# Patient Record
Sex: FEMALE | Race: WHITE | NOT HISPANIC OR LATINO | Employment: FULL TIME | URBAN - METROPOLITAN AREA
[De-identification: names, ages, dates, MRNs, and addresses within clinical notes are randomized per-mention and may not be internally consistent; named-entity substitution may affect disease eponyms.]

---

## 2018-05-30 ENCOUNTER — HISTORICAL (OUTPATIENT)
Dept: ADMINISTRATIVE | Facility: HOSPITAL | Age: 38
End: 2018-05-30

## 2018-07-16 ENCOUNTER — HISTORICAL (OUTPATIENT)
Dept: ADMINISTRATIVE | Facility: HOSPITAL | Age: 38
End: 2018-07-16

## 2019-08-28 ENCOUNTER — HISTORICAL (OUTPATIENT)
Dept: ADMINISTRATIVE | Facility: HOSPITAL | Age: 39
End: 2019-08-28

## 2022-04-29 VITALS
HEIGHT: 65 IN | DIASTOLIC BLOOD PRESSURE: 60 MMHG | WEIGHT: 173.94 LBS | WEIGHT: 188.94 LBS | HEIGHT: 65 IN | BODY MASS INDEX: 31.48 KG/M2 | BODY MASS INDEX: 29.53 KG/M2 | HEIGHT: 65 IN | WEIGHT: 177.25 LBS | SYSTOLIC BLOOD PRESSURE: 120 MMHG | SYSTOLIC BLOOD PRESSURE: 110 MMHG | SYSTOLIC BLOOD PRESSURE: 90 MMHG | DIASTOLIC BLOOD PRESSURE: 50 MMHG | DIASTOLIC BLOOD PRESSURE: 60 MMHG | BODY MASS INDEX: 28.98 KG/M2

## 2022-05-02 NOTE — HISTORICAL [facility] CERNER
This is a historical note converted from Kelsi. Formatting and pictures may have been removed.  Please reference Kelsi for original formatting and attached multimedia. Chief Complaint  LEFT HIP & LEFT KNEE PAIN  History of Present Illness  Pt has been having left hip bursitis for which she got an injection last July.? She feels a pressure/knot in that hip; she is unable to lie on that side. Her left knee has been bothering her for 3 months; started after she painted her sons room. She stood on a small stepladder to paint his room. She has problems bending her knee and is unable to kneel on it. Was popping a lot till she decreased her activities. She had experienced some giving out of her knee in the past. She doesnt take anything regularly in fear of upsetting her stomach. No history of knee problems.  Review of Systems  Pt also states her stomach was doing well till recently. She does have increased stress with her daughter who has Aspergers.? Her reflux is better but she still has lots of gas.? She has had to take Zantac every other evening for the last 3 weeks.  She had some popping her left ear x last week. Denies ear pain. Slight pressure; denies loss of hearing.  Physical Exam  Vitals & Measurements  HR:?72(Peripheral)? BP:?110/60?  HT:?165?cm? HT:?165?cm? WT:?78.9?kg? WT:?78.9?kg? BMI:?28.98?  Left hip: significant tenderness over greater trochanter; she has good range of motion in her hip none of which increases her hip pain  Left knee:? good flexion/extension, Negative Lachmans/McMurrays. Mildly tender medial to patella. Normal gait. Has difficulty arising from a seated position.  Ears: left: slight fluid accumulation, canal clear, no erythema.  Assessment/Plan  1.?Left knee pain  ?Diclofenac 50 mg bid (30 x 1)  Ordered:  Office/Outpatient Visit Level 4 Established [id number] PC, Left knee pain  Trochanteric bursitis  GERD - Gastro-esophageal reflux disease  Left serous otitis media, [facility],  05/30/18 14:11:00 CDT  ?  2.?Trochanteric bursitis  ?If Diclofenac is not helpful, we will inject her hip.  Ordered:  Office/Outpatient Visit Level 4 Established [id number] PC, Left knee pain  Trochanteric bursitis  GERD - Gastro-esophageal reflux disease  Left serous otitis media, [facility], 05/30/18 14:11:00 CDT  ?  3.?GERD - Gastro-esophageal reflux disease  ?Stable at this time. Continue Protonix daily and Zantac prn.  Ordered:  Office/Outpatient Visit Level 4 Established [id number] PC, Left knee pain  Trochanteric bursitis  GERD - Gastro-esophageal reflux disease  Left serous otitis media, [facility], 05/30/18 14:11:00 CDT  ?  4.?Left serous otitis media  ?Continue Zyrtec and Flonase.  Ordered:  Office/Outpatient Visit Level 4 Established [id number] PC, Left knee pain  Trochanteric bursitis  GERD - Gastro-esophageal reflux disease  Left serous otitis media, [facility], 05/30/18 14:11:00 CDT  ?  Left hip pain  ?See # 2.  Ordered:  XR Knee Left 1 or 2 Views, Routine, 05/30/18 13:49:00 CDT, Other (please specify), None, Ambulatory, Rad Type, Left hip pain, [facility], 05/30/18 13:49:00 CDT  ?  Orders:  pantoprazole, 40 mg = 1 tab(s), Oral, Daily, # 30 tab(s), 5 Refill(s), Pharmacy: [facility] [id number]  Clinic Follow-up PRN, 05/30/18 14:11:00 CDT, [facility], Future Order   Problem List/Past Medical History  Ongoing  GERD - Gastro-esophageal reflux disease  Left knee pain  Left serous otitis media  Trochanteric bursitis  Historical  Carpal tunnel syndrome of left wrist  Procedure/Surgical History  Dilation and curettage of uterus (2013).  Medications  diclofenac potassium 50 mg oral tablet, 50 mg= 1 tab(s), Oral, BID, PRN, 1 refills  multivitamin with fluoride, Daily  Pantoprazole 40 mg ORAL EC-Tablet, 40 mg= 1 tab(s), Oral, Daily, 5 refills  TAYTULLA CAPSULES 28S, 1 cap(s), Oral, Daily  Zyrtec 10 mg oral tablet, 10 mg= 1 tab(s), Oral, Daily  Allergies  No Known Medication  Allergies  Social History  Alcohol  Current, 3-5 times per week, 05/30/2018  Employment/School  Employed, Work/School description: [employer] A [employer]., 05/30/2018  Exercise  Home/Environment  Living situation: Home/Independent., 05/30/2018  Nutrition/Health  Type of diet: NO FRIED FOODS AT NIGHT. Regular, Caffeine intake amount: 1 CUP OF COFFEE PER DAY., 05/30/2018  Substance Abuse  Never, 05/30/2018  Tobacco  Never smoker, 05/04/2015  Family History  Back pain: Brother.  Kidney disease: Mother.  Peptic ulcer disease: Father.      Per patients request, she will be referred to Dermatology for evaluation of moles.   X-RAYS: negative
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
## 2022-05-02 NOTE — HISTORICAL [facility] CERNER
This is a historical note converted from Kelsi. Formatting and pictures may have been removed.  Please reference Kelsi for original formatting and attached multimedia. Chief Complaint  LEFT HIP BURSITIS  History of Present Illness  Pt has been having problems with her left hip x 2 years. She has received 2 injections in the past. The 1st one worked well but the second injection not so much. She cant take NSAIDs secondary to her stomach. She reports pain, stiffness and soreness in her left hip. She denies any history of a work up involving involving her left hip. She has problems going up and down stairs as well as regular walking. She has constant discomfort. She has stiffness with prolonged sitting or lying down. She denies any other joint involvement. She denies any other joint involvement except for a history of problem with her SI?joints.  Review of Systems  See HPI.  Physical Exam  Vitals & Measurements  HR:?88(Peripheral)? BP:?120/60?  HT:?165?cm? WT:?85.7?kg? BMI:?31.48?  General: She is a well-developed well-nourished white female no apparent distress.  Left hip: Normal in appearance,?no skin lesions,?normal gait noted.? She is able to climb on exam table without?difficulty.? She has tenderness to palpation over?her greater trochanter.? There is no discomfort noted with?hip flexion,?abduction, abduction?or internal/external rotation.  ?  Assessment/Plan  1.?Trochanteric bursitis?M70.60  Patient has persistent?left hip pain.? I am concerned she may have?another issue in addition to her?hip bursitis.  X-ray interpretation pending.  Will refer to Dr. Lee.  Ordered:  External Referral, left hip pain/bursitis, Ortho, Dr Lee, 08/28/19 10:57:00 CDT, Trochanteric bursitis  ?  Referrals  External Referral, left hip pain/bursitis, Ortho, Dr Lee, 08/28/19 10:57:00 CDT, Trochanteric bursitis   Problem List/Past Medical History  Ongoing  GERD - Gastro-esophageal reflux disease  Left knee pain  Left serous otitis  media  Left wrist sprain  Obesity  Trochanteric bursitis  Historical  Carpal tunnel syndrome of left wrist  Procedure/Surgical History  Colonoscopy (04/12/2019)  Dilation and curettage of uterus (2013)   Medications  diclofenac potassium 50 mg oral tablet, See Instructions, 2 refills  multivitamin with fluoride, Daily  Pantoprazole 40 mg ORAL EC-Tablet, 40 mg= 1 tab(s), Oral, Daily, 5 refills  ranitidine 300 mg oral tablet, 300 mg= 1 tab(s), Oral, qPM  TAYTULLA CAPSULES 28S, 1 cap(s), Oral, Daily  Zyrtec 10 mg oral tablet, 10 mg= 1 tab(s), Oral, Daily  Allergies  No Known Medication Allergies  Social History  Abuse/Neglect  No, 08/28/2019  Alcohol  Current, 3-5 times per week, 05/30/2018  Employment/School  Employed, Work/School description: [employer] A [employer]IVATE [employer]., 05/30/2018  Exercise  Home/Environment  Living situation: Home/Independent., 05/30/2018  Nutrition/Health  Type of diet: NO FRIED FOODS AT NIGHT. Regular, Caffeine intake amount: 1 CUP OF COFFEE PER DAY., 05/30/2018  Substance Use  Never, 05/30/2018  Tobacco  Never (less than 100 in lifetime), N/A, 08/28/2019  Never smoker, 05/04/2015  Family History  Back pain: Brother.  Kidney disease: Mother.  Peptic ulcer disease: Father.  Health Maintenance  Health Maintenance  ???Pending?(in the next year)  ??? ??OverDue  ??? ? ? ?Cervical Cancer Screening due??08/18/13??and every 3??year(s)  ??? ? ? ?Alcohol Misuse Screening due??01/01/19??and every 1??year(s)  ??? ??Due?  ??? ? ? ?ADL Screening due??08/28/19??and every 1??year(s)  ??? ? ? ?Depression Screening due??08/28/19??and every?  ??? ? ? ?Influenza Vaccine due??08/28/19??and every?  ??? ? ? ?Tetanus Vaccine due??08/28/19??and every 10??year(s)  ??? ??Due In Future?  ??? ? ? ?Obesity Screening not due until??01/01/20??and every 1??year(s)  ??? ? ? ?Blood Pressure Screening not due until??08/27/20??and every 1??year(s)  ??? ? ? ?Body Mass Index Check not due until??08/27/20??and every  1??year(s)  ???Satisfied?(in the past 1 year)  ??? ??Satisfied?  ??? ? ? ?Blood Pressure Screening on??08/28/19.??Satisfied by Delilah Staples LPN  ??? ? ? ?Body Mass Index Check on??08/28/19.??Satisfied by Delilah Staples LPN  ??? ? ? ?Obesity Screening on??08/28/19.??Satisfied by Delilah Staples LPN  ?      X-rays of left hip are normal.

## 2023-01-17 PROBLEM — J30.2 SEASONAL ALLERGIES: Status: ACTIVE | Noted: 2023-01-17

## 2023-01-17 PROBLEM — E66.811 CLASS 1 OBESITY DUE TO EXCESS CALORIES WITHOUT SERIOUS COMORBIDITY WITH BODY MASS INDEX (BMI) OF 32.0 TO 32.9 IN ADULT: Status: ACTIVE | Noted: 2023-01-17

## 2023-01-17 PROBLEM — E66.09 CLASS 1 OBESITY DUE TO EXCESS CALORIES WITHOUT SERIOUS COMORBIDITY WITH BODY MASS INDEX (BMI) OF 32.0 TO 32.9 IN ADULT: Status: ACTIVE | Noted: 2023-01-17

## 2023-01-17 PROBLEM — Z00.00 ENCOUNTER FOR WELLNESS EXAMINATION IN ADULT: Status: ACTIVE | Noted: 2023-01-17

## 2023-04-24 PROBLEM — Z00.00 ENCOUNTER FOR WELLNESS EXAMINATION IN ADULT: Status: RESOLVED | Noted: 2023-01-17 | Resolved: 2023-04-24

## 2024-01-17 ENCOUNTER — OFFICE VISIT (OUTPATIENT)
Dept: URGENT CARE | Facility: CLINIC | Age: 44
End: 2024-01-17
Payer: COMMERCIAL

## 2024-01-17 VITALS
DIASTOLIC BLOOD PRESSURE: 78 MMHG | HEIGHT: 64 IN | OXYGEN SATURATION: 100 % | RESPIRATION RATE: 20 BRPM | WEIGHT: 180 LBS | BODY MASS INDEX: 30.73 KG/M2 | HEART RATE: 118 BPM | SYSTOLIC BLOOD PRESSURE: 134 MMHG | TEMPERATURE: 98 F

## 2024-01-17 DIAGNOSIS — T14.8XXA ANIMAL BITE: Primary | ICD-10-CM

## 2024-01-17 DIAGNOSIS — S61.214A LACERATION OF RIGHT RING FINGER WITHOUT FOREIGN BODY WITHOUT DAMAGE TO NAIL, INITIAL ENCOUNTER: ICD-10-CM

## 2024-01-17 PROCEDURE — 12041 INTMD RPR N-HF/GENIT 2.5CM/<: CPT | Mod: ,,,

## 2024-01-17 PROCEDURE — 99203 OFFICE O/P NEW LOW 30 MIN: CPT | Mod: 25,,,

## 2024-01-17 PROCEDURE — 90715 TDAP VACCINE 7 YRS/> IM: CPT | Mod: ,,,

## 2024-01-17 PROCEDURE — 96372 THER/PROPH/DIAG INJ SC/IM: CPT | Mod: 59,,,

## 2024-01-17 PROCEDURE — 90471 IMMUNIZATION ADMIN: CPT | Mod: ,,,

## 2024-01-17 RX ORDER — CEFTRIAXONE 1 G/1
1 INJECTION, POWDER, FOR SOLUTION INTRAMUSCULAR; INTRAVENOUS
Status: COMPLETED | OUTPATIENT
Start: 2024-01-17 | End: 2024-01-17

## 2024-01-17 RX ORDER — AMOXICILLIN AND CLAVULANATE POTASSIUM 875; 125 MG/1; MG/1
1 TABLET, FILM COATED ORAL EVERY 12 HOURS
Qty: 14 TABLET | Refills: 0 | Status: SHIPPED | OUTPATIENT
Start: 2024-01-17 | End: 2024-01-24

## 2024-01-17 RX ORDER — HYDROCODONE BITARTRATE AND ACETAMINOPHEN 5; 325 MG/1; MG/1
1 TABLET ORAL EVERY 6 HOURS PRN
Qty: 12 TABLET | Refills: 0 | Status: SHIPPED | OUTPATIENT
Start: 2024-01-17 | End: 2024-01-20

## 2024-01-17 RX ADMIN — CEFTRIAXONE 1 G: 1 INJECTION, POWDER, FOR SOLUTION INTRAMUSCULAR; INTRAVENOUS at 04:01

## 2024-01-17 NOTE — PROGRESS NOTES
Patient presented to clinic with Horse bite to Right hand, fourth finger.  Location [address] [place] [address], they own the horse (brown, quafter) Male.    [facility] is closed. [facility] at [phone] and spoke with Viry to report the incident and provided the information above.   Shankar () given phone number to contact them when they get home.

## 2024-01-17 NOTE — PATIENT INSTRUCTIONS
Wound Care:   -Take antibiotic as directed to avoid infection.   -Loosen the bandage if needed.     -Suture removal/recheck in 7 to 10 days.   -Monitor for any signs of infection including worsening redness, swelling, purulent discharge, fever, body aches, or chills and seek follow up care as needed.

## 2024-01-17 NOTE — PROGRESS NOTES
"Subjective:      Patient ID: Virgen Samuel is a 43 y.o. female.    Vitals:  height is 5' 4" (1.626 m) and weight is 81.6 kg (180 lb). Her oral temperature is 98.1 °F (36.7 °C). Her blood pressure is 134/78 and her pulse is 118 (abnormal). Her respiration is 20 and oxygen saturation is 100%.     Chief Complaint: Animal Bite (Horse bite on right hand)    He was feeding her horse and her finger got caught causing the tooth to snag the skin of her right hand ring finger.  States she has full range of motion.         Skin:  Positive for laceration.      Objective:     Physical Exam   Constitutional: She is oriented to person, place, and time.   Abdominal: Normal appearance.   Musculoskeletal:      Right hand: Right ring finger: Exhibits bleeding (Normal range of motion, no obvious bony abnormality, no bone seen through skin tare. Small punture wound like area to lower finger, laceration/skin flap to proximal part of finger.).   Neurological: She is alert and oriented to person, place, and time.     Laceration Repair    Date/Time: 2024 4:00 PM    Performed by: Marychuy Harley NP  Authorized by: Marychuy Harley NP  Consent Done: Yes  Consent: Verbal consent obtained. Written consent obtained.  Risks and benefits: risks, benefits and alternatives were discussed  Consent given by: patient  Patient understanding: patient states understanding of the procedure being performed  Patient consent: the patient's understanding of the procedure matches consent given  Procedure consent: procedure consent matches procedure scheduled  Relevant documents: relevant documents present and verified  Patient identity confirmed: , MRN, name, provided demographic data and verbally with patient  Body area: upper extremity  Location details: right ring finger  Laceration length: 2 cm  Tendon involvement: none  Nerve involvement: none  Vascular damage: no  Anesthesia: digital block    Anesthesia:  Local Anesthetic: lidocaine 1% without " epinephrine  Anesthetic total: 5 mL    Patient sedated: no  Irrigation solution: saline  Irrigation method: syringe and jet lavage  Amount of cleaning: extensive (500 mls of normal saline, Betadine, peroxide)  Debridement: none  Degree of undermining: none  Skin closure: 3-0 nylon and Ethilon  Number of sutures: 3  Technique: simple  Approximation: loose  Approximation difficulty: simple  Dressing: antibiotic ointment, dressing applied and splint for protection  Patient tolerance: Patient tolerated the procedure well with no immediate complications  Comments: Wound thoroughly cleaned, no bone involvement noted, patient has full range of motion of finger.  Three very loose sutures placed to keep skin approximation to finger.       Assessment:     1. Animal bite    2. Laceration of right ring finger without foreign body without damage to nail, initial encounter        Plan:     Asked patient to do x-ray, she stated it was not necessary and that she has full range of motion.  Thoroughly explained to patient and  to watch for signs of infection and to return if any present or if she begins to have decreased range of motion of finger.     Extensive cleaning with 500 mL of normal saline as well as a mixture of Betadine/peroxide prior to putting 3 loose stitches.       Animal bite    Laceration of right ring finger without foreign body without damage to nail, initial encounter  -     Laceration Repair      Wound Care:   -Take antibiotic as directed to avoid infection.   -Loosen the bandage if needed.     -Suture removal/recheck in 7 to 10 days.   -Monitor for any signs of infection including worsening redness, swelling, purulent discharge, fever, body aches, or chills and seek follow up care as needed.

## 2024-01-17 NOTE — PROCEDURES
Laceration Repair    Date/Time: 2024 4:00 PM    Performed by: Marychuy Harley NP  Authorized by: Marychuy Harley NP  Consent Done: Yes  Consent: Verbal consent obtained. Written consent obtained.  Risks and benefits: risks, benefits and alternatives were discussed  Consent given by: patient  Patient understanding: patient states understanding of the procedure being performed  Patient consent: the patient's understanding of the procedure matches consent given  Procedure consent: procedure consent matches procedure scheduled  Relevant documents: relevant documents present and verified  Patient identity confirmed: , MRN, name, provided demographic data and verbally with patient  Body area: upper extremity  Location details: right ring finger  Laceration length: 2 cm  Tendon involvement: none  Nerve involvement: none  Vascular damage: no  Anesthesia: digital block    Anesthesia:  Local Anesthetic: lidocaine 1% without epinephrine  Anesthetic total: 5 mL    Patient sedated: no  Irrigation solution: saline  Irrigation method: syringe and jet lavage  Amount of cleaning: extensive (500 mls of normal saline, Betadine, peroxide)  Debridement: none  Degree of undermining: none  Skin closure: 3-0 nylon and Ethilon  Number of sutures: 3  Technique: simple  Approximation: loose  Approximation difficulty: simple  Dressing: antibiotic ointment, dressing applied and splint for protection  Patient tolerance: Patient tolerated the procedure well with no immediate complications  Comments: Wound thoroughly cleaned, no bone involvement noted, patient has full range of motion of finger.  Three very loose sutures placed to keep skin approximation to finger.

## 2024-03-19 ENCOUNTER — TELEPHONE (OUTPATIENT)
Dept: PRIMARY CARE CLINIC | Facility: CLINIC | Age: 44
End: 2024-03-19
Payer: COMMERCIAL

## 2024-03-19 DIAGNOSIS — Z00.00 ENCOUNTER FOR WELLNESS EXAMINATION IN ADULT: Primary | ICD-10-CM

## 2024-03-30 PROBLEM — Z00.00 ENCOUNTER FOR WELLNESS EXAMINATION IN ADULT: Status: RESOLVED | Noted: 2023-01-17 | Resolved: 2024-03-30

## 2024-07-01 PROBLEM — Z00.00 ENCOUNTER FOR WELLNESS EXAMINATION IN ADULT: Status: RESOLVED | Noted: 2023-01-17 | Resolved: 2024-07-01

## 2024-10-01 ENCOUNTER — HOSPITAL ENCOUNTER (OUTPATIENT)
Dept: RADIOLOGY | Facility: HOSPITAL | Age: 44
Discharge: HOME | End: 2024-10-01
Attending: OBSTETRICS & GYNECOLOGY
Payer: COMMERCIAL

## 2024-10-01 DIAGNOSIS — Z12.31 ENCOUNTER FOR SCREENING MAMMOGRAM FOR HIGH-RISK PATIENT: ICD-10-CM

## 2024-10-01 PROCEDURE — 77063 BREAST TOMOSYNTHESIS BI: CPT | Mod: 26,,, | Performed by: RADIOLOGY

## 2024-10-01 PROCEDURE — 77067 SCR MAMMO BI INCL CAD: CPT | Mod: 26,,, | Performed by: RADIOLOGY

## 2024-10-01 PROCEDURE — 77067 SCR MAMMO BI INCL CAD: CPT | Mod: TC

## 2025-01-28 DIAGNOSIS — Z91.89 AT HIGH RISK FOR BREAST CANCER: Primary | ICD-10-CM
